# Patient Record
Sex: FEMALE | Race: OTHER | HISPANIC OR LATINO | URBAN - METROPOLITAN AREA
[De-identification: names, ages, dates, MRNs, and addresses within clinical notes are randomized per-mention and may not be internally consistent; named-entity substitution may affect disease eponyms.]

---

## 2017-08-22 ENCOUNTER — INPATIENT (INPATIENT)
Facility: HOSPITAL | Age: 57
LOS: 2 days | Discharge: HOME | DRG: 192 | End: 2017-08-25
Attending: HOSPITALIST | Admitting: HOSPITALIST
Payer: MEDICAID

## 2017-08-22 VITALS
HEART RATE: 63 BPM | DIASTOLIC BLOOD PRESSURE: 50 MMHG | RESPIRATION RATE: 16 BRPM | OXYGEN SATURATION: 96 % | TEMPERATURE: 98 F | SYSTOLIC BLOOD PRESSURE: 118 MMHG | WEIGHT: 149.91 LBS | HEIGHT: 64 IN

## 2017-08-22 DIAGNOSIS — R04.2 HEMOPTYSIS: ICD-10-CM

## 2017-08-22 DIAGNOSIS — A15.9 RESPIRATORY TUBERCULOSIS UNSPECIFIED: ICD-10-CM

## 2017-08-22 DIAGNOSIS — Z29.9 ENCOUNTER FOR PROPHYLACTIC MEASURES, UNSPECIFIED: ICD-10-CM

## 2017-08-22 LAB
ALBUMIN SERPL ELPH-MCNC: 3.7 G/DL — SIGNIFICANT CHANGE UP (ref 3.5–5)
ALP SERPL-CCNC: 90 U/L — SIGNIFICANT CHANGE UP (ref 40–120)
ALT FLD-CCNC: 30 U/L DA — SIGNIFICANT CHANGE UP (ref 10–60)
ANION GAP SERPL CALC-SCNC: 8 MMOL/L — SIGNIFICANT CHANGE UP (ref 5–17)
APTT BLD: 39 SEC — HIGH (ref 27.5–37.4)
AST SERPL-CCNC: 27 U/L — SIGNIFICANT CHANGE UP (ref 10–40)
BILIRUB SERPL-MCNC: 0.6 MG/DL — SIGNIFICANT CHANGE UP (ref 0.2–1.2)
BUN SERPL-MCNC: 12 MG/DL — SIGNIFICANT CHANGE UP (ref 7–18)
CALCIUM SERPL-MCNC: 9.2 MG/DL — SIGNIFICANT CHANGE UP (ref 8.4–10.5)
CHLORIDE SERPL-SCNC: 109 MMOL/L — HIGH (ref 96–108)
CO2 SERPL-SCNC: 26 MMOL/L — SIGNIFICANT CHANGE UP (ref 22–31)
CREAT SERPL-MCNC: 0.68 MG/DL — SIGNIFICANT CHANGE UP (ref 0.5–1.3)
GLUCOSE SERPL-MCNC: 108 MG/DL — HIGH (ref 70–99)
HCT VFR BLD CALC: 41.6 % — SIGNIFICANT CHANGE UP (ref 34.5–45)
HGB BLD-MCNC: 13.9 G/DL — SIGNIFICANT CHANGE UP (ref 11.5–15.5)
INR BLD: 0.98 RATIO — SIGNIFICANT CHANGE UP (ref 0.88–1.16)
MCHC RBC-ENTMCNC: 32.2 PG — SIGNIFICANT CHANGE UP (ref 27–34)
MCHC RBC-ENTMCNC: 33.5 GM/DL — SIGNIFICANT CHANGE UP (ref 32–36)
MCV RBC AUTO: 96.2 FL — SIGNIFICANT CHANGE UP (ref 80–100)
PLATELET # BLD AUTO: 205 K/UL — SIGNIFICANT CHANGE UP (ref 150–400)
POTASSIUM SERPL-MCNC: 4 MMOL/L — SIGNIFICANT CHANGE UP (ref 3.5–5.3)
POTASSIUM SERPL-SCNC: 4 MMOL/L — SIGNIFICANT CHANGE UP (ref 3.5–5.3)
PROT SERPL-MCNC: 8 G/DL — SIGNIFICANT CHANGE UP (ref 6–8.3)
PROTHROM AB SERPL-ACNC: 10.7 SEC — SIGNIFICANT CHANGE UP (ref 9.8–12.7)
RBC # BLD: 4.32 M/UL — SIGNIFICANT CHANGE UP (ref 3.8–5.2)
RBC # FLD: 12 % — SIGNIFICANT CHANGE UP (ref 10.3–14.5)
SODIUM SERPL-SCNC: 143 MMOL/L — SIGNIFICANT CHANGE UP (ref 135–145)
WBC # BLD: 5.7 K/UL — SIGNIFICANT CHANGE UP (ref 3.8–10.5)
WBC # FLD AUTO: 5.7 K/UL — SIGNIFICANT CHANGE UP (ref 3.8–10.5)

## 2017-08-22 PROCEDURE — 71260 CT THORAX DX C+: CPT | Mod: 26

## 2017-08-22 PROCEDURE — 99285 EMERGENCY DEPT VISIT HI MDM: CPT

## 2017-08-22 PROCEDURE — 99223 1ST HOSP IP/OBS HIGH 75: CPT | Mod: GC

## 2017-08-22 RX ORDER — IPRATROPIUM/ALBUTEROL SULFATE 18-103MCG
3 AEROSOL WITH ADAPTER (GRAM) INHALATION EVERY 6 HOURS
Qty: 0 | Refills: 0 | Status: DISCONTINUED | OUTPATIENT
Start: 2017-08-22 | End: 2017-08-25

## 2017-08-22 NOTE — ED PROVIDER NOTE - OBJECTIVE STATEMENT
56 y/o F pt w/ PMHx of TB presents to ED c/o chest pain and cough w/ blood tinged sputum x3 weeks. Pt reports that she completed treatment for TB in January of 2017 and testing came back negative after treatment. Pt's ID doctor said that her symptoms can be expected but told pt to present to ED for CT if she is concerned. Pt's ID doctor is Dr. Skelton ([phone]). Pt denies fever, chills, or any other complaints. NKDA. 56 y/o F pt w/ PMHx of TB presents to ED c/o chest pain and cough w/ blood tinged sputum x3 weeks. Pt reports that she completed treatment for TB in January of 2017 and testing came back negative after treatment. Pt's ID doctor said that her symptoms can be expected but told pt to present to ED for CT if she is concerned. Pt's ID doctor is Dr. Skelton ([phone]). Pt denies fever, chills, or any other complaints. NKDA.  daughter Huma [phone] 56 y/o F pt w/ PMHx of TB presents to ED c/o chest pain and cough w/ blood tinged sputum x3 weeks. Pt reports that she completed treatment for TB in January of 2017 and testing came back negative after treatment. Pt's ID doctor said that her symptoms can be expected but told pt to present to ED for CT if she is concerned. Pt's ID doctor is Dr. Skelton ([phone] or [phone]). Pt denies fever, chills, or any other complaints. NKDA.  daughter Huma [phone]

## 2017-08-22 NOTE — H&P ADULT - NSHPLABSRESULTS_GEN_ALL_CORE
LABS:                        13.9   5.7   )-----------( 205      ( 22 Aug 2017 09:46 )             41.6     08-22    143  |  109<H>  |  12  ----------------------------<  108<H>  4.0   |  26  |  0.68    Ca    9.2      22 Aug 2017 09:46    TPro  8.0  /  Alb  3.7  /  TBili  0.6  /  DBili  x   /  AST  27  /  ALT  30  /  AlkPhos  90  08-22    PT/INR - ( 22 Aug 2017 09:46 )   PT: 10.7 sec;   INR: 0.98 ratio       PTT - ( 22 Aug 2017 09:46 )  PTT:39.0 sec    < from: CT Chest w/ IV Cont (08.22.17 @ 12:25) >    LUNGS AND LARGE AIRWAYS: Subsegmental consolidation versus scarring in   the right lung apex and appears segment of the left lower lobe. Scattered   areas of bronchiectasis in the right lung apex, right middle lobe, and   left upper lobe. Tubular branching opacities compatible with impacted   distal airways scattered throughout the lungs but with predominance in   the right upper and right middle lobes. No cavitary lesions.  PLEURA: No pleural effusion.  VESSELS: Within normal limits.  HEART: Heart size is normal.No pericardial effusion.  MEDIASTINUM AND BAILEE: No lymphadenopathy.  CHEST WALL AND LOWER NECK: Within normal limits.  VISUALIZED UPPER ABDOMEN: Within normal limits.  BONES: Degenerative changes of the spine.    IMPRESSION: Scattered areas of impacted airways and bronchiectasis.   Correlate for atypical infection such as TU.    < end of copied text >

## 2017-08-22 NOTE — H&P ADULT - PROBLEM SELECTOR PLAN 2
horace completed 6 month course of Abx starting January of this year (INH 300mg daily, rifampin 600mg daily, ethambutol 500mg daily and pyrazinimide 100mg daily) on 7/28/17, had negative sputum acid fast on 5/25 and 6/8 with recent CXR /17 showing no acute process except for a stable R perihilar nodular opacity  Spoke to Dr. Skelton [phone] from [facility] who verified information and recommended that patient has repeat acid fast sputum sent x3 and that she remains in isolation  Dr. All Jim - ID patient completed 6 month course of Abx starting January of this year (INH 300mg daily, rifampin 600mg daily, ethambutol 500mg daily and pyrazinimide 100mg daily) on 7/28/17, had negative sputum acid fast on 5/25 and 6/8 with recent CXR 8/17 showing no acute process except for a stable R perihilar nodular opacity  Spoke to Dr. Skelton [phone] from [facility] who verified information and recommended that patient has repeat acid fast sputum sent x3 and that she remains in isolation  Dr. All Jim - ID

## 2017-08-22 NOTE — H&P ADULT - PROBLEM SELECTOR PLAN 1
3 week blood tinged sputum with cough 3 week blood tinged sputum with cough  will r/o TB as per [facility] protocol  f/u acid-fast sputum x 3, airborne isolation  on exam: mild B/L wheeze  CT chest: scattered areas of impacted airways and bronchiectasis. Correlate for atypical infection such as TU;   Dr. Fenton- pulclau Jim- ID  denilson PRN for cough

## 2017-08-22 NOTE — H&P ADULT - HISTORY OF PRESENT ILLNESS
Patient is a 57F from home, lives with daughter[facility], with PMH TB (finished abx July 28th) presenting with 3 week Hx blood tinged sputum and cough, roughly 3 spoons- worth daily. [facility], patient completed 6 month course of Abx starting January of this year (INH 300mg daily, rifampin 600mg daily, ethambutol 500mg daily and pyrazinimide 100mg daily) on 7/28/17, had negative sputum acid fast on 5/25 and 6/8 with recent CXR /17 showing no acute process except for a stable R perihilar nodular opacity. Spoke to Dr. Skelton [phone] from [facility] who verified information and recommended that patient has repeat acid fast sputum sent x3 and that she remains in isolation. As per patient, denies sick contacts, recent travel, fever, chills, SOB, palpitations, nausea, vomiting, diarrhea, constipation, changes in weight, night sweats or any other complaints.

## 2017-08-22 NOTE — H&P ADULT - ATTENDING COMMENTS
Patient was seen and examined by myself with team at about 5 pm on August 23rd. Case was discussed with house staff in details. Patient was seen and examined by myself with team at about 5 pm on August 23rd. Case was discussed with house staff in details.  56 y/o F recently treated for pulmonary TB presents with hemoptysis  - respiratory isolation   - sputum AFB x 3  - Pulmo and ID consulted.  - Clinically no signs of active TB  Plan discussed with patient and family.

## 2017-08-22 NOTE — H&P ADULT - NSHPPHYSICALEXAM_GEN_ALL_CORE
INTERVAL HPI/OVERNIGHT EVENTS:  T(C): 36.7 (08-22-17 @ 16:25), Max: 36.7 (08-22-17 @ 08:47)  HR: 56 (08-22-17 @ 16:25) (56 - 63)  BP: 132/77 (08-22-17 @ 16:25) (118/50 - 132/77)  RR: 16 (08-22-17 @ 16:25) (16 - 16)  SpO2: 100% (08-22-17 @ 16:25) (96% - 100%)    PHYSICAL EXAM:  GENERAL: NAD, well-groomed, well-developed  HEAD:  Atraumatic, Normocephalic  EYES: EOMI, PERRLA, conjunctiva and sclera clear  ENMT: No tonsillar erythema, exudates, or enlargement; Moist mucous membranes, Good dentition, No lesions  NECK: Supple, No JVD, Normal thyroid  NERVOUS SYSTEM:  Alert & Oriented X3, Good concentration; Motor Strength 5/5 B/L upper and lower extremities; DTRs 2+ intact and symmetric  CHEST/LUNG: mild B/L wheeze  HEART: Regular rate and rhythm; No murmurs, rubs, or gallops  ABDOMEN: Soft, Nontender, Nondistended; Bowel sounds present  EXTREMITIES:  2+ Peripheral Pulses, No clubbing, cyanosis, or edema  SKIN: No rashes or lesions

## 2017-08-22 NOTE — ED PROVIDER NOTE - MEDICAL DECISION MAKING DETAILS
bloody sputum x 3 weeks, no night sweats or fever or weight loss, sp recent rx for tb with neg sputum after tx. attempted to reach [facility] physician Dr. Skelton at numbers above, was promised a call back re outpt plan. Will admit for rule out reactivation tb/ possible TU mgmt.

## 2017-08-22 NOTE — H&P ADULT - ASSESSMENT
Patient is a 57F from home, lives with daughter, [facility], with PMH TB (finished abx July 28th) presenting with 3 week Hx blood tinged sputum and cough, roughly 3 spoons- worth daily. Admitted for possible TU infection, r/o TB.

## 2017-08-22 NOTE — ED ADULT NURSE NOTE - OBJECTIVE STATEMENT
Patient came to the ED a/o x 3 for coughing blood yesterday. Patient has no SOB, no respiratory distress. Lung sounds are clear on auscultation.

## 2017-08-23 DIAGNOSIS — R73.03 PREDIABETES: ICD-10-CM

## 2017-08-23 LAB
24R-OH-CALCIDIOL SERPL-MCNC: 33.4 NG/ML — SIGNIFICANT CHANGE UP (ref 30–100)
ANION GAP SERPL CALC-SCNC: 7 MMOL/L — SIGNIFICANT CHANGE UP (ref 5–17)
BUN SERPL-MCNC: 12 MG/DL — SIGNIFICANT CHANGE UP (ref 7–18)
CALCIUM SERPL-MCNC: 9.4 MG/DL — SIGNIFICANT CHANGE UP (ref 8.4–10.5)
CHLORIDE SERPL-SCNC: 108 MMOL/L — SIGNIFICANT CHANGE UP (ref 96–108)
CHOLEST SERPL-MCNC: 172 MG/DL — SIGNIFICANT CHANGE UP (ref 10–199)
CO2 SERPL-SCNC: 25 MMOL/L — SIGNIFICANT CHANGE UP (ref 22–31)
CREAT SERPL-MCNC: 0.64 MG/DL — SIGNIFICANT CHANGE UP (ref 0.5–1.3)
FOLATE SERPL-MCNC: 15.9 NG/ML — SIGNIFICANT CHANGE UP (ref 4.8–24.2)
GLUCOSE SERPL-MCNC: 83 MG/DL — SIGNIFICANT CHANGE UP (ref 70–99)
HBA1C BLD-MCNC: 5.9 % — HIGH (ref 4–5.6)
HCT VFR BLD CALC: 41.8 % — SIGNIFICANT CHANGE UP (ref 34.5–45)
HDLC SERPL-MCNC: 57 MG/DL — SIGNIFICANT CHANGE UP (ref 40–125)
HGB BLD-MCNC: 14 G/DL — SIGNIFICANT CHANGE UP (ref 11.5–15.5)
LIPID PNL WITH DIRECT LDL SERPL: 106 MG/DL — SIGNIFICANT CHANGE UP
MAGNESIUM SERPL-MCNC: 2.3 MG/DL — SIGNIFICANT CHANGE UP (ref 1.6–2.6)
MCHC RBC-ENTMCNC: 32 PG — SIGNIFICANT CHANGE UP (ref 27–34)
MCHC RBC-ENTMCNC: 33.5 GM/DL — SIGNIFICANT CHANGE UP (ref 32–36)
MCV RBC AUTO: 95.5 FL — SIGNIFICANT CHANGE UP (ref 80–100)
NIGHT BLUE STAIN TISS: SIGNIFICANT CHANGE UP
PHOSPHATE SERPL-MCNC: 3.9 MG/DL — SIGNIFICANT CHANGE UP (ref 2.5–4.5)
PLATELET # BLD AUTO: 194 K/UL — SIGNIFICANT CHANGE UP (ref 150–400)
POTASSIUM SERPL-MCNC: 3.9 MMOL/L — SIGNIFICANT CHANGE UP (ref 3.5–5.3)
POTASSIUM SERPL-SCNC: 3.9 MMOL/L — SIGNIFICANT CHANGE UP (ref 3.5–5.3)
RBC # BLD: 4.38 M/UL — SIGNIFICANT CHANGE UP (ref 3.8–5.2)
RBC # FLD: 11.9 % — SIGNIFICANT CHANGE UP (ref 10.3–14.5)
SODIUM SERPL-SCNC: 140 MMOL/L — SIGNIFICANT CHANGE UP (ref 135–145)
SPECIMEN SOURCE: SIGNIFICANT CHANGE UP
TOTAL CHOLESTEROL/HDL RATIO MEASUREMENT: 3 RATIO — LOW (ref 3.3–7.1)
TRIGL SERPL-MCNC: 47 MG/DL — SIGNIFICANT CHANGE UP (ref 10–149)
TSH SERPL-MCNC: 5.04 UU/ML — HIGH (ref 0.34–4.82)
VIT B12 SERPL-MCNC: 795 PG/ML — SIGNIFICANT CHANGE UP (ref 243–894)
WBC # BLD: 6.4 K/UL — SIGNIFICANT CHANGE UP (ref 3.8–10.5)
WBC # FLD AUTO: 6.4 K/UL — SIGNIFICANT CHANGE UP (ref 3.8–10.5)

## 2017-08-23 PROCEDURE — 99233 SBSQ HOSP IP/OBS HIGH 50: CPT | Mod: GC

## 2017-08-23 RX ORDER — SODIUM CHLORIDE 9 MG/ML
5 INJECTION INTRAMUSCULAR; INTRAVENOUS; SUBCUTANEOUS ONCE
Qty: 0 | Refills: 0 | Status: COMPLETED | OUTPATIENT
Start: 2017-08-23 | End: 2017-08-23

## 2017-08-23 RX ADMIN — Medication 3 MILLILITER(S): at 21:27

## 2017-08-23 RX ADMIN — Medication 200 MILLIGRAM(S): at 20:58

## 2017-08-23 RX ADMIN — Medication 3 MILLILITER(S): at 15:19

## 2017-08-23 RX ADMIN — Medication 3 MILLILITER(S): at 03:14

## 2017-08-23 RX ADMIN — Medication 3 MILLILITER(S): at 08:31

## 2017-08-23 NOTE — CONSULT NOTE ADULT - SUBJECTIVE AND OBJECTIVE BOX
PULMONARY CONSULT NOTE      SOLIS WILLAMS  MRN-[id number]    Patient is a 57y old  Female who presents with a chief complaint of blood tinged sputum (22 Aug 2017 18:59) x 3 weeks of and on red, 2-3 tsf /24 hrs No night sweats anorexia fever or wt loss Hx chart lab and Ct Chest reviewed      HISTORY OF PRESENT ILLNESS:    MEDICATIONS  (STANDING):  ALBUTerol/ipratropium for Nebulization 3 milliLiter(s) Nebulizer every 6 hours  sodium chloride 3%  Inhalation 5 milliLiter(s) Inhalation once      MEDICATIONS  (PRN):      Allergies    No Known Allergies            PAST MEDICAL & SURGICAL HISTORY:  TB (tuberculosis) treated from jan to july 2017 as per [facility] with last 2 AFB --ve  No significant past surgical history      FAMILY HISTORY:  No pertinent family history in first degree relatives      SOCIAL HISTORY SMOKING --   ETOH --    DRUGS--  Lives at home with daughter    REVIEW OF SYSTEMS:  CONSTITUTIONAL: No fever, weight loss, or fatigue   EYES: No eye pain, visual disturbances, or discharge  ENT:  No difficulty hearing, tinnitus, vertigo; No sinus or throat pain  NECK: No pain or stiffness   RESPIRATORY:  cough   wheezing   chills   hemoptysis    Shortness of Breath  CARDIOVASCULAR: No chest pain, palpitations, passing out, dizziness, or leg swelling  GASTROINTESTINAL: No abdominal or epigastric pain. No nausea, vomiting, or hematemesis; No diarrhea or constipation. No melena or hematochezia.  GENITOURINARY: No dysuria, frequency, hematuria, or incontinence  NEUROLOGICAL: No headaches, memory loss, loss of strength, numbness, or tremors  SKIN: No itching, burning, rashes, or lesions   LYMPH Nodes: No enlarged glands  ENDOCRINE: No heat or cold intolerance; No hair loss  MUSCULOSKELETAL: No joint pain or swelling; No muscle, back, or extremity pain  PSYCHIATRIC: No depression, anxiety, mood swings, or difficulty sleeping  HEME/LYMPH: No easy bruising, or bleeding gums  ALLERGY AND IMMUNOLOGIC: No hives or eczema      Vital Signs Last 24 Hrs  T(C): 36.7 (23 Aug 2017 04:55), Max: 36.7 (22 Aug 2017 16:25)  T(F): 98.1 (23 Aug 2017 04:55), Max: 98.1 (23 Aug 2017 04:55)  HR: 64 (23 Aug 2017 04:55) (53 - 64)  BP: 109/61 (23 Aug 2017 04:55) (107/67 - 132/77)  BP(mean): --  RR: 17 (23 Aug 2017 04:55) (16 - 17)  SpO2: 96% (23 Aug 2017 04:55) (96% - 100%)  I&O's Detail      PHYSICAL EXAMINATION:    GENERAL: The patient is a well-developed, well-nourished in no apparent distress.   SKIN: No rashes ecchymoses or cyanosis  HEENT: Head is normocephalic and atraumatic. Extraocular muscles are intact. Mucous membranes are moist.   Neck supple LN not felt, JVP not increased  Thyroid not enlarged  Lymphatic: No lymphadenopathy  Cardiovascular:  S1 S2  heard ,RSR , JVP not increased , syst murmur at apex, No  gallop or rub  Respiratory:  Symmetrical chest wall movements Breathing vesicular , Percussion note normal no dulness   with   rales   wheeze  ABDOMEN:  Soft, Non-tender,   No  hepatosplenomegaly ,BS positive		  Extremities: Normal range of motion, No clubbing, cyanosis or edema , No calf tenderness  Vascular: Peripheral pulses palpable 2+ bilaterally  CNS:Alert and oriented x3,  Mood and affect appropriate  Cranial nerves intact  sensory intact  motor Power5/5, DTR 2+   Babinski neg    LABS:                        14.0   6.4   )-----------( 194      ( 23 Aug 2017 07:35 )             41.8     08-23    140  |  108  |  12  ----------------------------<  83  3.9   |  25  |  0.64    Ca    9.4      23 Aug 2017 07:35  Phos  3.9     08-23  Mg     2.3     08-23    TPro  8.0  /  Alb  3.7  /  TBili  0.6  /  DBili  x   /  AST  27  /  ALT  30  /  AlkPhos  90  08-22    PT/INR - ( 22 Aug 2017 09:46 )   PT: 10.7 sec;   INR: 0.98 ratio         PTT - ( 22 Aug 2017 09:46 )  PTT:39.0 sec        Ct scan chest:< from: CT Chest w/ IV Cont (08.22.17 @ 12:25) >  LUNGS AND LARGE AIRWAYS: Subsegmental consolidation versus scarring in   the right lung apex and appears segment of the left lower lobe. Scattered   areas of bronchiectasis in the right lung apex, right middle lobe, and   left upper lobe. Tubular branching opacities compatible with impacted   distal airways scattered throughout the lungs but with predominance in   the right upper and right middle lobes. No cavitary lesions.  PLEURA: No pleural effusion.  VESSELS: Within normal limits.  HEART: Heart size is normal.No pericardial effusion.  MEDIASTINUM AND BAILEE: No lymphadenopathy.

## 2017-08-23 NOTE — CONSULT NOTE ADULT - ASSESSMENT
Hemoptysis sec to Bronchiectasis B/L  Treated Pulmonary TB  MAIC less likely with no constitutional S/S    PLAN  Robitussin DM 2 tsf qid x4 days  Po augmentin or cipro x 7-10 days   CXR
hemoptysis and cough with h/o recent TB and treated with 6 months of antiTB meds  no evidence to suggest recurrence of TB  plan - get 3 sputum for AFB q 8 hrs and once neg will dc isolation and dc home.

## 2017-08-23 NOTE — CONSULT NOTE ADULT - NEGATIVE GENERAL SYMPTOMS
How Severe Is This Condition?: moderate
no weight loss/no fever/no fatigue/no malaise/no sweating/no anorexia/no chills

## 2017-08-23 NOTE — CONSULT NOTE ADULT - SUBJECTIVE AND OBJECTIVE BOX
HPI:  Patient is a 57F from home, lives with daughter, originally from [facility], with PMH TB (finished abx July 28th) presenting with 3 week Hx blood tinged sputum and cough, roughly 3 spoons- worth daily. As per patient's records from [facility], patient completed 6 month course of Abx starting January of this year (INH 300mg daily, rifampin 600mg daily, ethambutol 500mg daily and pyrazinimide 100mg daily) on 7/28/17, had negative sputum acid fast on 5/25 and 6/8 with recent CXR /17 showing no acute process except for a stable R perihilar nodular opacity. Spoke to Dr. Skelton [phone] from [facility] who verified information and recommended that patient has repeat acid fast sputum sent x3 and that she remains in isolation. As per patient, denies sick contacts, recent travel, fever, chills, SOB, palpitations, nausea, vomiting, diarrhea, constipation, changes in weight, night sweats or any other complaints. (22 Aug 2017 18:59)      PAST MEDICAL & SURGICAL HISTORY:  TB (tuberculosis)  No significant past surgical history      No Known Allergies      Meds:  ALBUTerol/ipratropium for Nebulization 3 milliLiter(s) Nebulizer every 6 hours  guaiFENesin    Syrup 200 milliGRAM(s) Oral every 6 hours PRN      SOCIAL HISTORY:  Smoker:  YES / NO        PACK YEARS:                         WHEN QUIT?  ETOH use:  YES / NO               FREQUENCY / QUANTITY:  Ilicit Drug use:  YES / NO  Occupation:  Assisted device use (Cane / Walker):  Live with:    FAMILY HISTORY:  No pertinent family history in first degree relatives      VITALS:  Vital Signs Last 24 Hrs  T(C): 36.7 (23 Aug 2017 14:35), Max: 36.7 (22 Aug 2017 16:25)  T(F): 98 (23 Aug 2017 14:35), Max: 98.1 (23 Aug 2017 04:55)  HR: 65 (23 Aug 2017 14:35) (53 - 65)  BP: 113/64 (23 Aug 2017 14:35) (107/67 - 132/77)  BP(mean): --  RR: 17 (23 Aug 2017 14:35) (16 - 17)  SpO2: 95% (23 Aug 2017 14:35) (95% - 100%)    LABS/DIAGNOSTIC TESTS:                          14.0   6.4   )-----------( 194      ( 23 Aug 2017 07:35 )             41.8     WBC Count: 6.4 K/uL (08-23 @ 07:35)  WBC Count: 5.7 K/uL (08-22 @ 09:46)      08-23    140  |  108  |  12  ----------------------------<  83  3.9   |  25  |  0.64    Ca    9.4      23 Aug 2017 07:35  Phos  3.9     08-23  Mg     2.3     08-23    TPro  8.0  /  Alb  3.7  /  TBili  0.6  /  DBili  x   /  AST  27  /  ALT  30  /  AlkPhos  90  08-22          LIVER FUNCTIONS - ( 22 Aug 2017 09:46 )  Alb: 3.7 g/dL / Pro: 8.0 g/dL / ALK PHOS: 90 U/L / ALT: 30 U/L DA / AST: 27 U/L / GGT: x             PT/INR - ( 22 Aug 2017 09:46 )   PT: 10.7 sec;   INR: 0.98 ratio         PTT - ( 22 Aug 2017 09:46 )  PTT:39.0 sec    LACTATE:    ABG -     CULTURES:       RADIOLOGY:< from: CT Chest w/ IV Cont (08.22.17 @ 12:25) >  EXAM:  CT CHEST IC                            PROCEDURE DATE:  08/22/2017          INTERPRETATION:  CLINICAL INFORMATION: Bloody sputum. Status post   treatment for tuberculosis. Negative sputum cultures in July 20, 2017.    COMPARISON: None    PROCEDURE:   CT of the Chest was performed with intravenous contrast.  95 ml of Omnipaque 350 was injected intravenously. 5 ml were discarded.  Sagittal and coronal reformats were performed.      FINDINGS:    CHEST:     LUNGS AND LARGE AIRWAYS: Subsegmental consolidation versus scarring in   the right lung apex and appears segment of the left lower lobe. Scattered   areas of bronchiectasis in the right lung apex, right middle lobe, and   left upper lobe. Tubular branching opacities compatible with impacted   distal airways scattered throughout the lungs but with predominance in   the right upper and right middle lobes. No cavitary lesions.  PLEURA: No pleural effusion.  VESSELS: Within normal limits.  HEART: Heart size is normal.No pericardial effusion.  MEDIASTINUM AND BAILEE: No lymphadenopathy.  CHEST WALL AND LOWER NECK: Within normal limits.  VISUALIZED UPPER ABDOMEN: Within normal limits.  BONES: Degenerative changes of the spine.      IMPRESSION: Scattered areas of impacted airways and bronchiectasis.   Correlate for atypical infection such as TU.            ROS  [  ] UNABLE TO ELICIT HPI:  Patient is a 57F from home, lives with daughter, originally from [facility], with PMH TB (finished abx July 28th) presenting with 3 week Hx blood tinged sputum and cough, roughly 3 spoons- worth daily. As per patient's records from [facility], patient completed 6 month course of Abx starting January of this year (INH 300mg daily, rifampin 600mg daily, ethambutol 500mg daily and pyrazinimide 100mg daily) on 7/28/17, had negative sputum acid fast on 5/25 and 6/8 with recent CXR  showing no acute process except for a stable R perihilar nodular opacity.  Dr. Skelton [phone] from [facility]  verified information and recommended that patient has repeat acid fast sputum sent x3 and that she remains in isolation. As per patient, denies sick contacts, recent travel, fever, chills, SOB, palpitations, nausea, vomiting, diarrhea, constipation, changes in weight, night sweats or any other complaints. (22 Aug 2017 18:59)      PAST MEDICAL & SURGICAL HISTORY:  TB (tuberculosis)  No significant past surgical history      No Known Allergies      Meds:  ALBUTerol/ipratropium for Nebulization 3 milliLiter(s) Nebulizer every 6 hours  guaiFENesin    Syrup 200 milliGRAM(s) Oral every 6 hours PRN      SOCIAL HISTORY:  Smoker:  YES / NO          ETOH use:  YES / NO              Ilicit Drug use:  YES / NO    FAMILY HISTORY:  No pertinent family history in first degree relatives      VITALS:  Vital Signs Last 24 Hrs  T(C): 36.7 (23 Aug 2017 14:35), Max: 36.7 (22 Aug 2017 16:25)  T(F): 98 (23 Aug 2017 14:35), Max: 98.1 (23 Aug 2017 04:55)  HR: 65 (23 Aug 2017 14:35) (53 - 65)  BP: 113/64 (23 Aug 2017 14:35) (107/67 - 132/77)  BP(mean): --  RR: 17 (23 Aug 2017 14:35) (16 - 17)  SpO2: 95% (23 Aug 2017 14:35) (95% - 100%)    LABS/DIAGNOSTIC TESTS:                          14.0   6.4   )-----------( 194      ( 23 Aug 2017 07:35 )             41.8     WBC Count: 6.4 K/uL (08-23 @ 07:35)  WBC Count: 5.7 K/uL (08-22 @ 09:46)      08-23    140  |  108  |  12  ----------------------------<  83  3.9   |  25  |  0.64    Ca    9.4      23 Aug 2017 07:35  Phos  3.9     08-23  Mg     2.3     08-23    TPro  8.0  /  Alb  3.7  /  TBili  0.6  /  DBili  x   /  AST  27  /  ALT  30  /  AlkPhos  90  08-22          LIVER FUNCTIONS - ( 22 Aug 2017 09:46 )  Alb: 3.7 g/dL / Pro: 8.0 g/dL / ALK PHOS: 90 U/L / ALT: 30 U/L DA / AST: 27 U/L / GGT: x             PT/INR - ( 22 Aug 2017 09:46 )   PT: 10.7 sec;   INR: 0.98 ratio         PTT - ( 22 Aug 2017 09:46 )  PTT:39.0 sec    LACTATE:    ABG -     CULTURES:       RADIOLOGY:< from: CT Chest w/ IV Cont (08.22.17 @ 12:25) >  EXAM:  CT CHEST IC                            PROCEDURE DATE:  08/22/2017          INTERPRETATION:  CLINICAL INFORMATION: Bloody sputum. Status post   treatment for tuberculosis. Negative sputum cultures in July 20, 2017.    COMPARISON: None    PROCEDURE:   CT of the Chest was performed with intravenous contrast.  95 ml of Omnipaque 350 was injected intravenously. 5 ml were discarded.  Sagittal and coronal reformats were performed.      FINDINGS:    CHEST:     LUNGS AND LARGE AIRWAYS: Subsegmental consolidation versus scarring in   the right lung apex and appears segment of the left lower lobe. Scattered   areas of bronchiectasis in the right lung apex, right middle lobe, and   left upper lobe. Tubular branching opacities compatible with impacted   distal airways scattered throughout the lungs but with predominance in   the right upper and right middle lobes. No cavitary lesions.  PLEURA: No pleural effusion.  VESSELS: Within normal limits.  HEART: Heart size is normal.No pericardial effusion.  MEDIASTINUM AND BAILEE: No lymphadenopathy.  CHEST WALL AND LOWER NECK: Within normal limits.  VISUALIZED UPPER ABDOMEN: Within normal limits.  BONES: Degenerative changes of the spine.      IMPRESSION: Scattered areas of impacted airways and bronchiectasis.   Correlate for atypical infection such as TU.            ROS  [  ] UNABLE TO ELICIT HPI:  Patient is a 57F from home, lives with daughter, originally from [facility], with PMH TB (finished abx July 28th) presenting with 3 week Hx blood tinged sputum and cough, roughly 3 spoons- worth daily. As per patient's records from [facility], patient completed 6 month course of Abx starting January of this year (INH 300mg daily, rifampin 600mg daily, ethambutol 500mg daily and pyrazinimide 100mg daily) on 7/28/17, had negative sputum acid fast on 5/25 and 6/8 with recent CXR  showing no acute process except for a stable R perihilar nodular opacity.  Dr. Skelton [phone] from [facility]  verified information and recommended that patient has repeat acid fast sputum sent x3 and that she remains in isolation. As per patient, denies sick contacts, recent travel, fever, chills, SOB, palpitations, nausea, vomiting, diarrhea, constipation, changes in weight, night sweats or any other complaints. (22 Aug 2017 18:59)      PAST MEDICAL & SURGICAL HISTORY:  TB (tuberculosis)  No significant past surgical history      No Known Allergies      Meds:  ALBUTerol/ipratropium for Nebulization 3 milliLiter(s) Nebulizer every 6 hours  guaiFENesin    Syrup 200 milliGRAM(s) Oral every 6 hours PRN      SOCIAL HISTORY:  Smoker:   NO          ETOH use:   NO              Ilicit Drug use:  NO    FAMILY HISTORY:  No pertinent family history in first degree relatives      VITALS:  Vital Signs Last 24 Hrs  T(C): 36.7 (23 Aug 2017 14:35), Max: 36.7 (22 Aug 2017 16:25)  T(F): 98 (23 Aug 2017 14:35), Max: 98.1 (23 Aug 2017 04:55)  HR: 65 (23 Aug 2017 14:35) (53 - 65)  BP: 113/64 (23 Aug 2017 14:35) (107/67 - 132/77)  BP(mean): --  RR: 17 (23 Aug 2017 14:35) (16 - 17)  SpO2: 95% (23 Aug 2017 14:35) (95% - 100%)    LABS/DIAGNOSTIC TESTS:                          14.0   6.4   )-----------( 194      ( 23 Aug 2017 07:35 )             41.8     WBC Count: 6.4 K/uL (08-23 @ 07:35)  WBC Count: 5.7 K/uL (08-22 @ 09:46)      08-23    140  |  108  |  12  ----------------------------<  83  3.9   |  25  |  0.64    Ca    9.4      23 Aug 2017 07:35  Phos  3.9     08-23  Mg     2.3     08-23    TPro  8.0  /  Alb  3.7  /  TBili  0.6  /  DBili  x   /  AST  27  /  ALT  30  /  AlkPhos  90  08-22          LIVER FUNCTIONS - ( 22 Aug 2017 09:46 )  Alb: 3.7 g/dL / Pro: 8.0 g/dL / ALK PHOS: 90 U/L / ALT: 30 U/L DA / AST: 27 U/L / GGT: x             PT/INR - ( 22 Aug 2017 09:46 )   PT: 10.7 sec;   INR: 0.98 ratio         PTT - ( 22 Aug 2017 09:46 )  PTT:39.0 sec    LACTATE:    ABG -     CULTURES:       RADIOLOGY:< from: CT Chest w/ IV Cont (08.22.17 @ 12:25) >  EXAM:  CT CHEST IC                            PROCEDURE DATE:  08/22/2017          INTERPRETATION:  CLINICAL INFORMATION: Bloody sputum. Status post   treatment for tuberculosis. Negative sputum cultures in July 20, 2017.    COMPARISON: None    PROCEDURE:   CT of the Chest was performed with intravenous contrast.  95 ml of Omnipaque 350 was injected intravenously. 5 ml were discarded.  Sagittal and coronal reformats were performed.      FINDINGS:    CHEST:     LUNGS AND LARGE AIRWAYS: Subsegmental consolidation versus scarring in   the right lung apex and appears segment of the left lower lobe. Scattered   areas of bronchiectasis in the right lung apex, right middle lobe, and   left upper lobe. Tubular branching opacities compatible with impacted   distal airways scattered throughout the lungs but with predominance in   the right upper and right middle lobes. No cavitary lesions.  PLEURA: No pleural effusion.  VESSELS: Within normal limits.  HEART: Heart size is normal.No pericardial effusion.  MEDIASTINUM AND BAILEE: No lymphadenopathy.  CHEST WALL AND LOWER NECK: Within normal limits.  VISUALIZED UPPER ABDOMEN: Within normal limits.  BONES: Degenerative changes of the spine.      IMPRESSION: Scattered areas of impacted airways and bronchiectasis.   Correlate for atypical infection such as TU.            ROS  [  ] UNABLE TO ELICIT

## 2017-08-24 PROCEDURE — 99232 SBSQ HOSP IP/OBS MODERATE 35: CPT | Mod: GC

## 2017-08-24 RX ORDER — SODIUM CHLORIDE 9 MG/ML
5 INJECTION INTRAMUSCULAR; INTRAVENOUS; SUBCUTANEOUS ONCE
Qty: 0 | Refills: 0 | Status: DISCONTINUED | OUTPATIENT
Start: 2017-08-24 | End: 2017-08-25

## 2017-08-24 RX ADMIN — Medication 3 MILLILITER(S): at 09:46

## 2017-08-24 RX ADMIN — Medication 200 MILLIGRAM(S): at 04:35

## 2017-08-24 RX ADMIN — Medication 3 MILLILITER(S): at 02:12

## 2017-08-24 RX ADMIN — Medication 3 MILLILITER(S): at 14:41

## 2017-08-24 RX ADMIN — Medication 3 MILLILITER(S): at 20:57

## 2017-08-24 RX ADMIN — Medication 200 MILLIGRAM(S): at 20:13

## 2017-08-24 NOTE — PROGRESS NOTE ADULT - PROBLEM SELECTOR PLAN 4
improve score of 1  no need for chemical DVT px at this time
improve score of 1  no need for chemical DVT px at this time

## 2017-08-24 NOTE — PROGRESS NOTE ADULT - PROBLEM SELECTOR PLAN 1
likely 2/2 known bronchiectatic lung and concern for possible TU on imaging, but pt is without significant symptomology such as fever, weight loss, new worsening cough.  if AFB x3 negative, will DC home   pulmonary and ID following  no need for antimicrobial therapy at this time
likely 2/2 known bronchiectatic lung and concern for possible TU on imaging, but pt is without significant symptomology such as fever, weight loss, new worsening cough.  will obtain AFB sputum/culture  pulmonary following

## 2017-08-24 NOTE — PROGRESS NOTE ADULT - PROBLEM SELECTOR PLAN 2
s/p therapy  will f/u ID to decide need for continued isolation as pt clinical presentation is not consistent with active pulmonary Tb.
s/p therapy  will f/u ID to decide need for continued isolation as pt clinical presentation is not consistent with active pulmonary Tb.

## 2017-08-24 NOTE — PROGRESS NOTE ADULT - PROBLEM SELECTOR PLAN 3
a1c of 5.9  diabetic diet ordered  diabetic education to be provided
a1c of 5.9  diabetic diet ordered  diabetic education to be provided

## 2017-08-25 VITALS
TEMPERATURE: 98 F | OXYGEN SATURATION: 94 % | RESPIRATION RATE: 16 BRPM | DIASTOLIC BLOOD PRESSURE: 78 MMHG | HEART RATE: 81 BPM | SYSTOLIC BLOOD PRESSURE: 113 MMHG

## 2017-08-25 LAB
NIGHT BLUE STAIN TISS: SIGNIFICANT CHANGE UP
NIGHT BLUE STAIN TISS: SIGNIFICANT CHANGE UP
SPECIMEN SOURCE: SIGNIFICANT CHANGE UP
SPECIMEN SOURCE: SIGNIFICANT CHANGE UP
TSH SERPL-MCNC: 3.72 UU/ML — SIGNIFICANT CHANGE UP (ref 0.34–4.82)

## 2017-08-25 PROCEDURE — 84443 ASSAY THYROID STIM HORMONE: CPT

## 2017-08-25 PROCEDURE — 87015 SPECIMEN INFECT AGNT CONCNTJ: CPT

## 2017-08-25 PROCEDURE — 82607 VITAMIN B-12: CPT

## 2017-08-25 PROCEDURE — 87116 MYCOBACTERIA CULTURE: CPT

## 2017-08-25 PROCEDURE — 85610 PROTHROMBIN TIME: CPT

## 2017-08-25 PROCEDURE — 85027 COMPLETE CBC AUTOMATED: CPT

## 2017-08-25 PROCEDURE — 80048 BASIC METABOLIC PNL TOTAL CA: CPT

## 2017-08-25 PROCEDURE — 94640 AIRWAY INHALATION TREATMENT: CPT

## 2017-08-25 PROCEDURE — 80053 COMPREHEN METABOLIC PANEL: CPT

## 2017-08-25 PROCEDURE — 84100 ASSAY OF PHOSPHORUS: CPT

## 2017-08-25 PROCEDURE — 82746 ASSAY OF FOLIC ACID SERUM: CPT

## 2017-08-25 PROCEDURE — 85730 THROMBOPLASTIN TIME PARTIAL: CPT

## 2017-08-25 PROCEDURE — 99285 EMERGENCY DEPT VISIT HI MDM: CPT | Mod: 25

## 2017-08-25 PROCEDURE — 80061 LIPID PANEL: CPT

## 2017-08-25 PROCEDURE — 83036 HEMOGLOBIN GLYCOSYLATED A1C: CPT

## 2017-08-25 PROCEDURE — 71260 CT THORAX DX C+: CPT

## 2017-08-25 PROCEDURE — 82306 VITAMIN D 25 HYDROXY: CPT

## 2017-08-25 PROCEDURE — 99239 HOSP IP/OBS DSCHRG MGMT >30: CPT

## 2017-08-25 PROCEDURE — 83735 ASSAY OF MAGNESIUM: CPT

## 2017-08-25 PROCEDURE — 87206 SMEAR FLUORESCENT/ACID STAI: CPT

## 2017-08-25 RX ADMIN — Medication 200 MILLIGRAM(S): at 14:46

## 2017-08-25 RX ADMIN — Medication 3 MILLILITER(S): at 09:27

## 2017-08-25 RX ADMIN — Medication 200 MILLIGRAM(S): at 02:39

## 2017-08-25 RX ADMIN — Medication 3 MILLILITER(S): at 14:26

## 2017-08-25 RX ADMIN — Medication 200 MILLIGRAM(S): at 08:56

## 2017-08-25 NOTE — PROGRESS NOTE ADULT - ATTENDING COMMENTS
I have examined pt personally Hx chart lab and xrays reviewed and pt discussed with residents
I have examined pt personally Hx chart lab and xrays reviewed and pt discussed with residents
Patient was seen and examined by myself with team. Case was discussed with house staff in details.
Patient was seen and examined by myself with team. Case was discussed with house staff in details.

## 2017-08-25 NOTE — DISCHARGE NOTE ADULT - MEDICATION SUMMARY - MEDICATIONS TO TAKE
I will START or STAY ON the medications listed below when I get home from the hospital:    guaiFENesin 100 mg/5 mL oral liquid  -- 10 milliliter(s) by mouth every 6 hours, As Needed  -- Indication: For cough    amoxicillin-clavulanate 875 mg-125 mg oral tablet  -- 1 tab(s) by mouth 2 times a day  -- Finish all this medication unless otherwise directed by prescriber.  Take with food or milk.    -- Indication: For Pna

## 2017-08-25 NOTE — DISCHARGE NOTE ADULT - CARE PLAN
Principal Discharge DX:	Pneumonia  Goal:	resolution  Instructions for follow-up, activity and diet:	complete course of augmentin for 7 days  repeat chest imaging in 4-6 weeks  follow with pulmonary doctor outpatient in 1 week  Secondary Diagnosis:	TB (tuberculosis)  Instructions for follow-up, activity and diet:	completed therapy in past Principal Discharge DX:	Bronchiectasis with (acute) exacerbation  Goal:	resolution  Instructions for follow-up, activity and diet:	complete course of augmentin for 7 days  repeat chest imaging in 4-6 weeks  follow with pulmonary doctor outpatient in 1 week  Secondary Diagnosis:	Hemoptysis  Goal:	resolution of symptomology  Instructions for follow-up, activity and diet:	please return if you experience fevers, worsened/new bloody sputum, worsened sob  Secondary Diagnosis:	TB (tuberculosis)  Instructions for follow-up, activity and diet:	history of TB s/p completion of therapy

## 2017-08-25 NOTE — DISCHARGE NOTE ADULT - HOSPITAL COURSE
57F from home, lives with daughter, originally from [facility], with PMH TB (finished abx July 28th) presenting with 3 week Hx blood tinged sputum and cough, roughly 3 spoons- worth daily. As per patient's records from [facility], patient completed 6 month course of Abx starting January of this year (INH 300mg daily, rifampin 600mg daily, ethambutol 500mg daily and pyrazinimide 100mg daily) on 7/28/17, had negative sputum acid fast on 5/25 and 6/8 with recent CXR /17 showing no acute process except for a stable R perihilar nodular opacity. Spoke to Dr. Skelton [phone] from [facility] who verified information and recommended that patient has repeat acid fast sputum sent x3 and that she remains in isolation. As per patient, denies sick contacts, recent travel, fever, chills, SOB, palpitations, nausea, vomiting, diarrhea, constipation, changes in weight, night sweats or any other complaints.  Pt admitted with bronchiectasis and bloody sputum, with episode of hemoptysis at home concerning for PNA. Per [facility], active Tb was to be ruled out, so pt was initially isolated and AFB x3 sputum collection was done. ID and pulmonary consultations endorsed that pt did not require isolation as she was not displaying presentation of active Tb or TU. CT chest found bronchiectasis. Pt was stable inpatient, given bronchodilators, cough suppressant as needed. Pt with negative AFBs and to be DC on augmentin with outpatient pulmonary followup 57F from home, lives with daughter, originally from [facility], with PMH TB (finished abx July 28th) presenting with 3 week Hx blood tinged sputum and cough, roughly 3 spoons- worth daily. As per patient's records from [facility], patient completed 6 month course of Abx starting January of this year (INH 300mg daily, rifampin 600mg daily, ethambutol 500mg daily and pyrazinimide 100mg daily) on 7/28/17, had negative sputum acid fast on 5/25 and 6/8 with recent CXR /17 showing no acute process except for a stable R perihilar nodular opacity. Spoke to Dr. Skelton [phone] from [facility] who verified information and recommended that patient has repeat acid fast sputum sent x3 and that she remains in isolation. As per patient, denies sick contacts, recent travel, fever, chills, SOB, palpitations, nausea, vomiting, diarrhea, constipation, changes in weight, night sweats or any other complaints.  Pt admitted with bronchiectasis and bloody sputum, with episode of hemoptysis at home concerning for PNA. Per [facility], active Tb was to be ruled out, so pt was initially isolated and AFB x3 sputum collection was done. ID and pulmonary consultations endorsed that pt did not require isolation as she was not displaying presentation of active Tb or TU. CT chest found bronchiectasis. Pt was stable inpatient, given bronchodilators, cough suppressant as needed. Pt with negative AFBs and to be DC on augmentin with outpatient pulmonary followup    Med Attd Discharge Day Addendum  Patient was seen and evaluated at bedside with SAVANAH Beaver. Instructions and plan of care reviewed with the patient via Wolof phone . Questions were answered. Patient expressed understanding that she is to follow-up with her pmd and pulmonary with Dr. ADRIAN Fenton.   35 min spent on discharge planning and discussion 57F from home, lives with daughter, originally from [facility], with PMH TB (finished abx July 28th) presenting with 3 week Hx blood tinged sputum and cough, roughly 3 spoons- worth daily. As per patient's records from [facility], patient completed 6 month course of Abx starting January of this year (INH 300mg daily, rifampin 600mg daily, ethambutol 500mg daily and pyrazinimide 100mg daily) on 7/28/17, had negative sputum acid fast on 5/25 and 6/8 with recent CXR /17 showing no acute process except for a stable R perihilar nodular opacity. Spoke to Dr. Skelton [phone] from [facility] who verified information and recommended that patient has repeat acid fast sputum sent x3 and that she remains in isolation. As per patient, denies sick contacts, recent travel, fever, chills, SOB, palpitations, nausea, vomiting, diarrhea, constipation, changes in weight, night sweats or any other complaints.  Pt admitted with bronchiectasis and bloody sputum, with episode of hemoptysis at home concerning for PNA. Per [facility], active Tb was to be ruled out, so pt was initially isolated and AFB x3 sputum collection was done. ID and pulmonary consultations endorsed that pt did not require isolation as she was not displaying presentation of active Tb or TU. CT chest found bronchiectasis. Pt was stable inpatient, given bronchodilators, cough suppressant as needed. Pt with negative AFBs and to be DC on augmentin with outpatient pulmonary followup    Med Attd Discharge Day Addendum  Patient was seen and evaluated at bedside with SAVANAH Beaver on the day of discharge 8/25/17. AFB Instructions and plan of care reviewed with the patient via Bel[place]ian phone . Questions were answered. Patient expressed understanding that she is to follow-up with her pmd and pulmonary with Dr. ADRIAN Fenton.  Final dx at time of discharge: bronchiectasis with hemoptysis in setting of prior history of treated tuberculosis.  35 min spent on discharge planning and discussion 57F from home, lives with daughter, originally from [facility], with PMH TB (finished abx July 28th) presenting with 3 week Hx blood tinged sputum and cough, roughly 3 spoons- worth daily. As per patient's records from [facility], patient completed 6 month course of Abx starting January of this year (INH 300mg daily, rifampin 600mg daily, ethambutol 500mg daily and pyrazinimide 100mg daily) on 7/28/17, had negative sputum acid fast on 5/25 and 6/8 with recent CXR /17 showing no acute process except for a stable R perihilar nodular opacity. Spoke to Dr. Skelton [phone] from [facility] who verified information and recommended that patient has repeat acid fast sputum sent x3 and that she remains in isolation. As per patient, denies sick contacts, recent travel, fever, chills, SOB, palpitations, nausea, vomiting, diarrhea, constipation, changes in weight, night sweats or any other complaints.  Pt admitted with bronchiectasis and bloody sputum, with episode of hemoptysis at home concerning for PNA. Per [facility], active Tb was to be ruled out, so pt was initially isolated and AFB x3 sputum collection was done. ID and pulmonary consultations endorsed that pt did not require isolation as she was not displaying presentation of active Tb or TU. CT chest found bronchiectasis. Pt was stable inpatient, given bronchodilators, cough suppressant as needed. Pt with negative AFBs and to be DC on augmentin with outpatient pulmonary followup    Med Attd Discharge Day Addendum  Patient was seen and evaluated at bedside with SAVANAH Beaver on the day of discharge 8/25/17. AFB Instructions and plan of care reviewed with the patient via Kiswahili phone . Questions were answered. Patient expressed understanding that she is to follow-up with her pmd and pulmonary with Dr. ADRIAN Fenton.  Final dx at time of discharge: bronchiectasis with hemoptysis in setting of prior history of treated tuberculosis.  35 min spent on discharge planning and discussion.    ATTENDING OF RECORD ADDENDUM:  received call from core lab on sept 5th about positive AFB cultures but pending identification. On Set 6th , follow up on results revealed Mycobacterium avium complex. Patient has been treated for 6 month course for PTB prior to recent hospitalization for hemoptysis. She currently does not have active TB but evidence of MAC on final sputum AFb cultures. I discussed with ID attending- no further intervention required. Infection control team to notify DARRON about updated cultures.  On

## 2017-08-25 NOTE — DISCHARGE NOTE ADULT - CARE PROVIDER_API CALL
Tal Fenton (EMA), Internal Medicine; Pulmonary Disease  [address] [place] [address]  Phone: [phone]  Fax: [phone]

## 2017-08-25 NOTE — DISCHARGE NOTE ADULT - PLAN OF CARE
completed therapy in past resolution complete course of augmentin for 7 days  repeat chest imaging in 4-6 weeks  follow with pulmonary doctor outpatient in 1 week history of TB s/p completion of therapy resolution of symptomology please return if you experience fevers, worsened/new bloody sputum, worsened sob

## 2017-08-25 NOTE — DISCHARGE NOTE ADULT - PATIENT PORTAL LINK FT
“You can access the [facility] Patient Portal, offered by [facility], by registering with the following website: [email or web address]”

## 2017-08-25 NOTE — PROGRESS NOTE ADULT - ASSESSMENT
Hemoptysis sec to Bronchiectasis B/L  Treated Pulmonary TB  MAIC less likely with no constitutional S/S    PLAN  Robitussin DM 2 tsf qid x4 days  Po augmentin or cipro x 7-10 days   D/C plan as per PMD
56 yo F recently tx for pulmonary TB completing 6 mo course of RIPE, now p/w c/o 1 episode of hemoptysis, and otherwise some blood-tinged sputum, with CT findings concerning for TU, admitted initially to r/o active pulmonary Tb
58 yo F recently tx for pulmonary TB completing 6 mo course of RIPE, now p/w c/o 1 episode of hemoptysis, and otherwise some blood-tinged sputum, with CT findings concerning for TU, admitted initially to r/o active pulmonary Tb
Hemoptysis sec to Bronchiectasis B/L  Treated Pulmonary TB  MAIC less likely with no constitutional S/S    PLAN  Robitussin DM 2 tsf qid x4 days  Po augmentin or cipro x 7-10 days   CXR

## 2017-08-25 NOTE — PROGRESS NOTE ADULT - SUBJECTIVE AND OBJECTIVE BOX
HPI:  Patient is a 57F from home, lives with daughter, originally from [place], with PMH TB (finished abx July 28th) presenting with 3 week Hx blood tinged sputum and cough, roughly 3 spoons- worth daily. As per patient's records from [facility], patient completed 6 month course of Abx starting January of this year (INH 300mg daily, rifampin 600mg daily, ethambutol 500mg daily and pyrazinimide 100mg daily) on 7/28/17, had negative sputum acid fast on 5/25 and 6/8 with recent CXR /17 showing no acute process except for a stable R perihilar nodular opacity. Spoke to Dr. Skelton [phone] from [facility] who verified information and recommended that patient has repeat acid fast sputum sent x3 and that she remains in isolation. As per patient, denies sick contacts, recent travel, fever, chills, SOB, palpitations, nausea, vomiting, diarrhea, constipation, changes in weight, night sweats or any other complaints. (22 Aug 2017 18:59)      Patient is a 57y old  Female who presents with a chief complaint of blood tinged sputum (22 Aug 2017 18:59)      INTERVAL HPI/OVERNIGHT EVENTS: cough stable; no longer coughing blood    T(C): 36.7 (08-23-17 @ 04:55), Max: 36.7 (08-22-17 @ 16:25)  HR: 64 (08-23-17 @ 04:55) (53 - 64)  BP: 109/61 (08-23-17 @ 04:55) (107/67 - 132/77)  RR: 17 (08-23-17 @ 04:55) (16 - 17)  SpO2: 96% (08-23-17 @ 04:55) (96% - 100%)  Wt(kg): --  I&O's Summary      REVIEW OF SYSTEMS: denies fever, chills, SOB, palpitations, chest pain, abdominal pain, nausea, vomitting, diarrhea, constipation, dizziness    MEDICATIONS  (STANDING):  ALBUTerol/ipratropium for Nebulization 3 milliLiter(s) Nebulizer every 6 hours  sodium chloride 3%  Inhalation 5 milliLiter(s) Inhalation once    MEDICATIONS  (PRN):  guaiFENesin    Syrup 200 milliGRAM(s) Oral every 6 hours PRN Cough      PHYSICAL EXAM:  GENERAL: NAD, well-groomed, well-developed  HEAD:  Atraumatic, Normocephalic  EYES: EOMI, PERRLA, conjunctiva and sclera clear  ENMT: No tonsillar erythema, exudates, or enlargement; Moist mucous membranes  NECK: Supple, No JVD, Normal thyroid  NERVOUS SYSTEM:  Alert & Oriented X3, Good concentration; Motor Strength 5/5 B/L upper and lower extremities  CHEST/LUNG: BLAE, diminished BS at bases, L lung zone rales  HEART: Regular rate and rhythm; No murmurs, rubs, or gallops  ABDOMEN: Soft, Nontender, Nondistended; Bowel sounds present  EXTREMITIES:  2+ Peripheral Pulses, No clubbing, cyanosis, or edema  LYMPH: No lymphadenopathy noted  SKIN: No rashes or lesions  LABS:                        14.0   6.4   )-----------( 194      ( 23 Aug 2017 07:35 )             41.8     08-23    140  |  108  |  12  ----------------------------<  83  3.9   |  25  |  0.64    Ca    9.4      23 Aug 2017 07:35  Phos  3.9     08-23  Mg     2.3     08-23    TPro  8.0  /  Alb  3.7  /  TBili  0.6  /  DBili  x   /  AST  27  /  ALT  30  /  AlkPhos  90  08-22    PT/INR - ( 22 Aug 2017 09:46 )   PT: 10.7 sec;   INR: 0.98 ratio         PTT - ( 22 Aug 2017 09:46 )  PTT:39.0 sec    CAPILLARY BLOOD GLUCOSE
HPI:  Patient is a 57F from home, lives with daughter, originally from [facility], with PMH TB (finished abx July 28th) presenting with 3 week Hx blood tinged sputum and cough, roughly 3 spoons- worth daily. As per patient's records from [facility], patient completed 6 month course of Abx starting January of this year (INH 300mg daily, rifampin 600mg daily, ethambutol 500mg daily and pyrazinimide 100mg daily) on 7/28/17, had negative sputum acid fast on 5/25 and 6/8 with recent CXR /17 showing no acute process except for a stable R perihilar nodular opacity. Spoke to Dr. Skelton [phone] from [facility] who verified information and recommended that patient has repeat acid fast sputum sent x3 and that she remains in isolation. As per patient, denies sick contacts, recent travel, fever, chills, SOB, palpitations, nausea, vomiting, diarrhea, constipation, changes in weight, night sweats or any other complaints. (22 Aug 2017 18:59)      Patient is a 57y old  Female who presents with a chief complaint of blood tinged sputum (22 Aug 2017 18:59)      INTERVAL HPI/OVERNIGHT EVENTS: pt clinically improving    T(C): 36.7 (08-23-17 @ 04:55), Max: 36.7 (08-22-17 @ 16:25)  HR: 64 (08-23-17 @ 04:55) (53 - 64)  BP: 109/61 (08-23-17 @ 04:55) (107/67 - 132/77)  RR: 17 (08-23-17 @ 04:55) (16 - 17)  SpO2: 96% (08-23-17 @ 04:55) (96% - 100%)  Wt(kg): --  I&O's Summary      REVIEW OF SYSTEMS: denies fever, chills, SOB, palpitations, chest pain, abdominal pain, nausea, vomitting, diarrhea, constipation, dizziness    MEDICATIONS  (STANDING):  ALBUTerol/ipratropium for Nebulization 3 milliLiter(s) Nebulizer every 6 hours  sodium chloride 3%  Inhalation 5 milliLiter(s) Inhalation once    MEDICATIONS  (PRN):  guaiFENesin    Syrup 200 milliGRAM(s) Oral every 6 hours PRN Cough      PHYSICAL EXAM:  GENERAL: NAD, well-groomed, well-developed  HEAD:  Atraumatic, Normocephalic  EYES: EOMI, PERRLA, conjunctiva and sclera clear  ENMT: No tonsillar erythema, exudates, or enlargement; Moist mucous membranes  NECK: Supple, No JVD, Normal thyroid  NERVOUS SYSTEM:  Alert & Oriented X3, Good concentration; Motor Strength 5/5 B/L upper and lower extremities  CHEST/LUNG: BLAE, diminished BS at bases, L lung zone rales  HEART: Regular rate and rhythm; No murmurs, rubs, or gallops  ABDOMEN: Soft, Nontender, Nondistended; Bowel sounds present  EXTREMITIES:  2+ Peripheral Pulses, No clubbing, cyanosis, or edema  LYMPH: No lymphadenopathy noted  SKIN: No rashes or lesions  LABS:                        14.0   6.4   )-----------( 194      ( 23 Aug 2017 07:35 )             41.8     08-23    140  |  108  |  12  ----------------------------<  83  3.9   |  25  |  0.64    Ca    9.4      23 Aug 2017 07:35  Phos  3.9     08-23  Mg     2.3     08-23    TPro  8.0  /  Alb  3.7  /  TBili  0.6  /  DBili  x   /  AST  27  /  ALT  30  /  AlkPhos  90  08-22    PT/INR - ( 22 Aug 2017 09:46 )   PT: 10.7 sec;   INR: 0.98 ratio         PTT - ( 22 Aug 2017 09:46 )  PTT:39.0 sec    CAPILLARY BLOOD GLUCOSE
PULMONARY  progress note    SOLIS WILLAMS  MRN-[id number]    Patient is a 57y old  Female who presents with a chief complaint of blood tinged sputum (22 Aug 2017 18:59)  No hemoptysis  ID consult reviewed      MEDICATIONS  (STANDING):  ALBUTerol/ipratropium for Nebulization 3 milliLiter(s) Nebulizer every 6 hours      MEDICATIONS  (PRN):  guaiFENesin    Syrup 200 milliGRAM(s) Oral every 6 hours PRN Cough      Allergies    No Known Allergies        PAST MEDICAL & SURGICAL HISTORY:  TB (tuberculosis)  No significant past surgical history           REVIEW OF SYSTEMS:  CONSTITUTIONAL: No fever, weight loss, or fatigue   EYES: No eye pain, visual disturbances, or discharge  ENT:  No difficulty hearing, tinnitus, vertigo; No sinus or throat pain  NECK: No pain or stiffness or nodes  RESPIRATORY:  cough+  wheezing-  chills--   hemoptysis--  Shortness of Breath-  CARDIOVASCULAR: No chest pain, palpitations, passing out, dizziness, or leg swelling  GASTROINTESTINAL: No abdominal or epigastric pain. No nausea, vomiting, or hematemesis; No diarrhea or constipation. No melena or hematochezia.  GENITOURINARY: No dysuria, frequency, hematuria, or incontinence  NEUROLOGICAL: No headaches, memory loss, loss of strength, numbness, or tremors  SKIN: No itching, burning, rashes, or lesions   LYMPH Nodes: No enlarged glands  ENDOCRINE: No heat or cold intolerance; No hair loss  MUSCULOSKELETAL: No joint pain or swelling; No muscle, back, or extremity pain  PSYCHIATRIC: No depression, anxiety, mood swings, or difficulty sleeping  HEME/LYMPH: No easy bruising, or bleeding gums  ALLERGY AND IMMUNOLOGIC: No hives or eczema    Vital Signs Last 24 Hrs  T(C): 36.6 (24 Aug 2017 05:50), Max: 36.7 (23 Aug 2017 14:35)  T(F): 97.8 (24 Aug 2017 05:50), Max: 98 (23 Aug 2017 14:35)  HR: 58 (24 Aug 2017 05:50) (57 - 65)  BP: 113/73 (24 Aug 2017 05:50) (104/58 - 113/73)  BP(mean): --  RR: 16 (24 Aug 2017 05:50) (16 - 17)  SpO2: 95% (24 Aug 2017 05:50) (95% - 100%)  I&O's Detail      PHYSICAL EXAMINATION:    GENERAL: The patient is a well-developed, well-nourished in no apparent distress.   SKIN no rash ecchymoses or bruises  HEENT: Head is normocephalic and atraumatic  CLARA , Extraocular muscles are intact. Mucous membranes  moist.   Neck supple ,No LN felt JVP not increased  Thyroid not enlarged  Cardiovascular:  S1 S2 heard, RSR, No JVD , systolic  murmur at apex, No gallop or rub  Respiratory: Chest wall symmetrical with good air entry ,Percussion note normal,    Lungs vesicular breathing with no   rales or   wheeze	  ABDOMEN:  Soft, Non-tender,  no hepatomegaly or splenomegaly BS positive	  Extremities: Normal range of motion, No clubbing, cyanosis or edema  Vascular: Peripheral pulses palpable 2+ bilaterally  CNS:  Alert and oriented x3   Cranial nerves intact  sensory intact  motor power5/5  dtr 2+   Babinski neg    LABS:                        14.0   6.4   )-----------( 194      ( 23 Aug 2017 07:35 )             41.8     08-23    140  |  108  |  12  ----------------------------<  83  3.9   |  25  |  0.64    Ca    9.4      23 Aug 2017 07:35  Phos  3.9     08-23  Mg     2.3     08-23    TPro  8.0  /  Alb  3.7  /  TBili  0.6  /  DBili  x   /  AST  27  /  ALT  30  /  AlkPhos  90  08-22    PT/INR - ( 22 Aug 2017 09:46 )   PT: 10.7 sec;   INR: 0.98 ratio         PTT - ( 22 Aug 2017 09:46 )  PTT:39.0 sec    HbA1C  5.9    Folate, Serum: 15.9 ng/mL (08-23-17 @ 09:20)  Vitamin B12, Serum: 795 pg/mL (08-23-17 @ 09:20)      MICROBIOLOGY: Sputum afb --ve      RADIOLOGY & ADDITIONAL STUDIES:    CXR:    ECHO:
PULMONARY  progress note    SOLIS WILLAMS  MRN-[id number]    Patient is a 57y old  Female who presents with a chief complaint of blood tinged sputum (22 Aug 2017 18:59)  Feels better No more hemoptysis    MEDICATIONS  (STANDING):  ALBUTerol/ipratropium for Nebulization 3 milliLiter(s) Nebulizer every 6 hours  sodium chloride 3%  Inhalation 5 milliLiter(s) Inhalation once  guaiFENesin    Syrup 200 milliGRAM(s) Oral every 6 hours      MEDICATIONS  (PRN):    Allergies    No Known Allergies    PAST MEDICAL & SURGICAL HISTORY:  TB (tuberculosis)  No significant past surgical history           REVIEW OF SYSTEMS:  CONSTITUTIONAL: No fever, weight loss, or fatigue   EYES: No eye pain, visual disturbances, or discharge  ENT:  No difficulty hearing, tinnitus, vertigo; No sinus or throat pain  NECK: No pain or stiffness or nodes  RESPIRATORY: no  cough   wheezing   chills   hemoptysis or  Shortness of Breath  CARDIOVASCULAR: No chest pain, palpitations, passing out, dizziness, or leg swelling  GASTROINTESTINAL: No abdominal or epigastric pain. No nausea, vomiting, or hematemesis; No diarrhea or constipation. No melena or hematochezia.  GENITOURINARY: No dysuria, frequency, hematuria, or incontinence  NEUROLOGICAL: No headaches, memory loss, loss of strength, numbness, or tremors  SKIN: No itching, burning, rashes, or lesions   LYMPH Nodes: No enlarged glands  ENDOCRINE: No heat or cold intolerance; No hair loss  MUSCULOSKELETAL: No joint pain or swelling; No muscle, back, or extremity pain  PSYCHIATRIC: No depression, anxiety, mood swings, or difficulty sleeping  HEME/LYMPH: No easy bruising, or bleeding gums  ALLERGY AND IMMUNOLOGIC: No hives or eczema    Vital Signs Last 24 Hrs  T(C): 36.7 (25 Aug 2017 05:46), Max: 37.1 (24 Aug 2017 13:44)  T(F): 98 (25 Aug 2017 05:46), Max: 98.7 (24 Aug 2017 13:44)  HR: 59 (25 Aug 2017 05:46) (59 - 84)  BP: 103/54 (25 Aug 2017 05:46) (103/54 - 120/77)  BP(mean): --  RR: 16 (25 Aug 2017 05:46) (16 - 16)  SpO2: 97% (25 Aug 2017 05:46) (97% - 100%)  I&O's Detail      PHYSICAL EXAMINATION:    GENERAL: The patient is a well-developed, well-nourished in no apparent distress.   SKIN no rash ecchymoses or bruises  HEENT: Head is normocephalic and atraumatic  CLARA , Extraocular muscles are intact. Mucous membranes  moist.   Neck supple ,No LN felt JVP not increased  Thyroid not enlarged  Cardiovascular:  S1 S2 heard, RSR, No JVD , systolic  murmur at apex, No gallop or rub  Respiratory: Chest wall symmetrical with good air entry ,Percussion note normal,    Lungs vesicular breathing with  no  rales  or  wheeze	  ABDOMEN:  Soft, Non-tender,  no hepatomegaly or splenomegaly BS positive	  Extremities: Normal range of motion, No clubbing, cyanosis or edema  Vascular: Peripheral pulses palpable 2+ bilaterally  CNS:  Alert and oriented x3   Cranial nerves intact  sensory intact  motor power5/5  dtr 2+   Babinski neg    LABS:      Folate, Serum: 15.9 ng/mL (08-23-17 @ 09:20)  Vitamin B12, Serum: 795 pg/mL (08-23-17 @ 09:20)      MICROBIOLOGY: sputum afb x2 --ve

## 2017-08-29 DIAGNOSIS — J18.9 PNEUMONIA, UNSPECIFIED ORGANISM: ICD-10-CM

## 2017-08-29 DIAGNOSIS — R07.9 CHEST PAIN, UNSPECIFIED: ICD-10-CM

## 2017-08-29 DIAGNOSIS — Z87.09 PERSONAL HISTORY OF OTHER DISEASES OF THE RESPIRATORY SYSTEM: ICD-10-CM

## 2017-08-29 DIAGNOSIS — J47.9 BRONCHIECTASIS, UNCOMPLICATED: ICD-10-CM

## 2017-10-11 LAB
CULTURE RESULTS: SIGNIFICANT CHANGE UP
SPECIMEN SOURCE: SIGNIFICANT CHANGE UP

## 2019-05-28 NOTE — DISCHARGE NOTE ADULT - NS AS DC FU INST LIST INST
Patient given results. Patient verbalized understanding and had no further questions at this time.   no
